# Patient Record
Sex: FEMALE | Race: OTHER | HISPANIC OR LATINO | URBAN - METROPOLITAN AREA
[De-identification: names, ages, dates, MRNs, and addresses within clinical notes are randomized per-mention and may not be internally consistent; named-entity substitution may affect disease eponyms.]

---

## 2020-03-09 ENCOUNTER — EMERGENCY (EMERGENCY)
Facility: HOSPITAL | Age: 55
LOS: 1 days | Discharge: HOME | End: 2020-03-09
Attending: EMERGENCY MEDICINE
Payer: MEDICAID

## 2020-03-09 VITALS
HEIGHT: 59 IN | TEMPERATURE: 98 F | DIASTOLIC BLOOD PRESSURE: 79 MMHG | OXYGEN SATURATION: 98 % | SYSTOLIC BLOOD PRESSURE: 121 MMHG | WEIGHT: 139.99 LBS | RESPIRATION RATE: 16 BRPM | HEART RATE: 72 BPM

## 2020-03-09 PROCEDURE — 99282 EMERGENCY DEPT VISIT SF MDM: CPT | Mod: 25

## 2020-03-09 PROCEDURE — 10060 I&D ABSCESS SIMPLE/SINGLE: CPT

## 2020-03-09 PROCEDURE — 99283 EMERGENCY DEPT VISIT LOW MDM: CPT | Mod: 25

## 2020-03-09 RX ORDER — IBUPROFEN 200 MG
1 TABLET ORAL
Qty: 20 | Refills: 0
Start: 2020-03-09 | End: 2020-03-13

## 2020-03-09 RX ORDER — IBUPROFEN 200 MG
600 TABLET ORAL ONCE
Refills: 0 | Status: COMPLETED | OUTPATIENT
Start: 2020-03-09 | End: 2020-03-09

## 2020-03-09 RX ADMIN — Medication 600 MILLIGRAM(S): at 22:45

## 2020-03-09 NOTE — ED PROVIDER NOTE - SKIN WOUND DESCRIPTION
right lower inner breast with 2.5cm diameter abscess with localized tenderness to palpation, mild barbra abscess induration

## 2020-03-09 NOTE — ED PROVIDER NOTE - ATTENDING CONTRIBUTION TO CARE
Superficial abscess to R breast. Needle aspiration performed (aspirated 4 cc of pus). Will dc with Doxy Rx and breast surgeon follow up as needed in 1 week.

## 2020-03-09 NOTE — ED ADULT NURSE NOTE - NSIMPLEMENTINTERV_GEN_ALL_ED
Implemented All Universal Safety Interventions:  [place] to call system. Call bell, personal items and telephone within reach. Instruct patient to call for assistance. Room bathroom lighting operational. Non-slip footwear when patient is off stretcher. Physically safe environment: no spills, clutter or unnecessary equipment. Stretcher in lowest position, wheels locked, appropriate side rails in place.

## 2020-03-09 NOTE — ED PROVIDER NOTE - NSFOLLOWUPINSTRUCTIONS_ED_ALL_ED_FT
Crystal un seguimiento con barber médico en 2-3 días.  Si ez síntomas no se resuelven en 1 semana, crystal un seguimiento con el cirujano de seno, zulma más arriba.  Si experimenta algún síntoma nuevo o que empeora o si le preocupa, siempre puede volver a la emergencia para patrick reevaluación.        Follow up with your doctor in 2-3 days.  If you symptoms don't resolve in 1 week follow up with the breast surgeon see above.  If you experience any new or worsening symptoms or if you are concerned you can always come back to the emergency for a re-evaluation.

## 2020-03-09 NOTE — ED PROVIDER NOTE - CARE PROVIDER_API CALL
Kip Alvarado)  Surgery  [address] [facility], [place] [address]  Phone: [phone]  Fax: [phone]  Follow Up Time:

## 2020-03-09 NOTE — ED PROVIDER NOTE - PATIENT PORTAL LINK FT
You can access the FollowMyHealth Patient Portal offered by [facility] by registering at the following website: [email or web address]. By joining [employer]’s FollowMyHealth portal, you will also be able to view your health information using other applications (apps) compatible with our system.

## 2020-03-09 NOTE — ED PROVIDER NOTE - PROGRESS NOTE DETAILS
Needle aspiration performed and drained 4 cc of pus. Will give Doxy given early cellulitis. Will need PMD follow up in 2-3 days. If no improvement in 5-7 days follow up with breast surgeon. Pt is well appearing walking with steady gait, stable for discharge and follow up without fail with medical doctor. I had a detailed discussion with the patient and/or guardian regarding the historical points, exam findings, and any diagnostic results supporting the discharge diagnosis. Pt educated on care and need for follow up. Strict return instructions and red flag signs and symptoms discussed with patient. Questions answered. Pt shows understanding of discharge information and agrees to follow.

## 2020-03-10 PROBLEM — Z00.00 ENCOUNTER FOR PREVENTIVE HEALTH EXAMINATION: Status: ACTIVE | Noted: 2020-03-10

## 2021-08-10 NOTE — ED PROVIDER NOTE - OBJECTIVE STATEMENT
"                    Clinical Nutrition       Patient Name: Zohra Hall  YOB: 1940  MRN: [id number]  Date of Encounter: 08/10/21 14:59 EDT  Admission date: 2021      Reason for Visit   MDR, Identified at risk by screening criteria, MST score 2+, \"Unsure\" unintentional weight loss      EMR  Reviewed   Yes  NO significant weight change noted     Reported/Observed/Food/Nutrition Related - Comments     Pt interviewed for food preferences . RD discussed meaning of no mixed consistencies and nectar thick liquids w/ pt and son over the phone.  She notes she could  Have possibly  loss 5-10 lbs over the past year.        Current Nutrition Prescription     Diet Dysphagia; IV - Mechanical Soft No Mixed Consistencies; Nectar / Syrup Thick; Cardiac  Orders Placed This Encounter      DIET MESSAGE Pt dislikes: chicken, turkey, fish, tea Will eat beef pork, and Italian food. Please send NTL lemonade w/ lunch and dinner      Average Intake from [facility]%- 1 meal      Actions     Follow treatment progress, Care plan reviewed, Advise alternate selection, Interview for preferences, Menu adjusted, Encourage intake    Monitor Per Protocol      Geovanna Gusman, MS,RD,LD,   Time Spent: 30 mins        " [place]  #[id number].     53 y/o F pt with no significant PMHx, presents to the ED with chief complaint of right breast abscess. Patient reports she noticed a small pimple to the right inner lower quadrant of the breast 4 days ago. States pimple got progressively bigger and more painful. No alleviating or aggravating factors. Patient denies drainage, fever, chills or any other complaints. NKDA.

## 2022-12-19 NOTE — ED ADULT TRIAGE NOTE - STATUS:
Hmg CoA Reductase Inhibitors (Statin) Refill Protocol - 12 Month Protocol Passed 12/16/2022 03:22 PM   Protocol Details  Lipid Panel or Direct LDL resulted within last 15 months    Patient is NOT on Gemfibrozil    Seen by prescribing provider or same department within the last 12 months or has a future appt in 3 months - IF FAILED PLEASE LOOK AT CHART REVIEW FOR LAST VISIT AND PROCEED ACCORDINGLY    Request is NOT for Simvastatin 80 mg or Vytorin 10-80 mg    Medication (including dose and sig) on current meds list     Last ov: 12/14/2022  Labs:02/09/2022  atorvastatin (LIPITOR) 20 MG tablet [[id number]]     Order Details    12/14/2022 sent in for 1 year supply      Applied